# Patient Record
Sex: MALE | Race: WHITE | Employment: FULL TIME | URBAN - METROPOLITAN AREA
[De-identification: names, ages, dates, MRNs, and addresses within clinical notes are randomized per-mention and may not be internally consistent; named-entity substitution may affect disease eponyms.]

---

## 2023-12-28 NOTE — PROGRESS NOTES
non-tender.  No hepatosplenomegaly.  Bowel sounds are normal.     SKIN:   There are no rashes, cyanosis, jaundice, or ecchymosis present.   EXTREMITIES:   The extremities are unremarkable without clubbing, cyanosis, joint inflammation, degenerative, or ischemic change.   MUSCULOSKELETAL:   There is no abnormal tone, muscle atrophy, or abnormal movement present.   NEURO:   The patient is alert and oriented to person, place, and time.  Memory appears intact and mood is normal.  No gross sensorimotor deficits are present.      ASSESSMENT:  (Medical Decision Making)         ICD-10-CM    1. LAURA (obstructive sleep apnea)  G47.33 Ambulatory Referral to Sleep Studies -previous diagnostic sleep study with an AHI of 13.6 and lowest oxygen saturation of 81%. The pathophysiology of obstructive sleep apnea was reviewed with the patient.  It's potential to promote severe neurologic, cardiac, pulmonary, and gastrointestinal problems was discussed. Specifically, the increased incidence of hypertension, coronary artery disease, congestive heart failure, pulmonary hypertension, gastroesophageal reflux, pathologic hypersomnolence, memory loss, and glucose intolerance was related to the consequences of hypoxemia, hypercapnia, airway obstruction, and sympathetic overdrive.  We also discussed the ability of nasal CPAP to correct these abnormalities through maintenance of a patent airway.  Therapeutic options including surgery, oral appliances, and weight loss were also reviewed.  Patient is currently wearing an oral appliance but reports he is having breakthrough symptoms such as snoring and daytime sleepiness.  He was previously seen by Dr. Boggs but needs a sleep study or another oral appliance to be made.  Will order home sleep study today and refer him back to Dr. Boggs after study for the oral appliance.  Advised patient to not wear his oral appliance for at least 3 days prior to completing sleep      2. Snoring  R06.83

## 2024-01-02 ENCOUNTER — OFFICE VISIT (OUTPATIENT)
Dept: SLEEP MEDICINE | Age: 59
End: 2024-01-02
Payer: COMMERCIAL

## 2024-01-02 VITALS
OXYGEN SATURATION: 96 % | HEART RATE: 80 BPM | WEIGHT: 196 LBS | SYSTOLIC BLOOD PRESSURE: 120 MMHG | TEMPERATURE: 97.3 F | DIASTOLIC BLOOD PRESSURE: 64 MMHG

## 2024-01-02 DIAGNOSIS — G47.10 HYPERSOMNOLENCE: ICD-10-CM

## 2024-01-02 DIAGNOSIS — G47.8 NON-RESTORATIVE SLEEP: ICD-10-CM

## 2024-01-02 DIAGNOSIS — R06.81 WITNESSED APNEIC SPELLS: ICD-10-CM

## 2024-01-02 DIAGNOSIS — R51.9 MORNING HEADACHE: ICD-10-CM

## 2024-01-02 DIAGNOSIS — G47.33 OSA (OBSTRUCTIVE SLEEP APNEA): Primary | ICD-10-CM

## 2024-01-02 DIAGNOSIS — R06.83 SNORING: ICD-10-CM

## 2024-01-02 PROCEDURE — 99203 OFFICE O/P NEW LOW 30 MIN: CPT | Performed by: NURSE PRACTITIONER

## 2024-01-02 RX ORDER — ESOMEPRAZOLE MAGNESIUM 40 MG/1
CAPSULE, DELAYED RELEASE ORAL
COMMUNITY
Start: 2023-11-13

## 2024-01-02 RX ORDER — DUPILUMAB 300 MG/2ML
INJECTION, SOLUTION SUBCUTANEOUS
COMMUNITY

## 2024-01-02 ASSESSMENT — SLEEP AND FATIGUE QUESTIONNAIRES
HOW LIKELY ARE YOU TO NOD OFF OR FALL ASLEEP WHILE SITTING QUIETLY AFTER LUNCH WITHOUT ALCOHOL: 2
HOW LIKELY ARE YOU TO NOD OFF OR FALL ASLEEP WHILE LYING DOWN TO REST IN THE AFTERNOON WHEN CIRCUMSTANCES PERMIT: 2
HOW LIKELY ARE YOU TO NOD OFF OR FALL ASLEEP WHILE SITTING AND READING: 1
HOW LIKELY ARE YOU TO NOD OFF OR FALL ASLEEP WHILE SITTING INACTIVE IN A PUBLIC PLACE: 2
HOW LIKELY ARE YOU TO NOD OFF OR FALL ASLEEP IN A CAR, WHILE STOPPED FOR A FEW MINUTES IN TRAFFIC: 1
HOW LIKELY ARE YOU TO NOD OFF OR FALL ASLEEP WHEN YOU ARE A PASSENGER IN A CAR FOR AN HOUR WITHOUT A BREAK: 0
ESS TOTAL SCORE: 9
HOW LIKELY ARE YOU TO NOD OFF OR FALL ASLEEP WHILE SITTING AND TALKING TO SOMEONE: 0
HOW LIKELY ARE YOU TO NOD OFF OR FALL ASLEEP WHILE WATCHING TV: 1

## 2024-01-02 NOTE — PATIENT INSTRUCTIONS
Home sleep study ordered  Refer back to Dr. Boggs after sleep study completed for oral appliance  Recommendations as above  Follow-up 6 months after getting new oral appliance with Dr. Boggs or sooner if needed

## 2024-04-05 ENCOUNTER — TELEPHONE (OUTPATIENT)
Dept: SLEEP MEDICINE | Age: 59
End: 2024-04-05

## 2024-04-08 NOTE — TELEPHONE ENCOUNTER
Called pt let him know that he needed to retest per the notes in [facility]  Let him know if he has retested  someone will call him when the test has been read. Left the number to [employer] [phone].  ОЛЕГ

## 2024-04-16 ENCOUNTER — TELEPHONE (OUTPATIENT)
Dept: SLEEP MEDICINE | Age: 59
End: 2024-04-16

## 2024-04-16 DIAGNOSIS — R06.83 SNORING: Primary | ICD-10-CM

## 2024-04-16 NOTE — TELEPHONE ENCOUNTER
Pt is calling for sleep study results. It appears that the HST failed. Please call him to discuss.   His wife is going to kill him if he does not get snoring under control. He may need in lab study.     **He asked that you call him on Thursday 4/18/24 after 1:00 pm.**

## 2024-08-27 ENCOUNTER — HOSPITAL ENCOUNTER (OUTPATIENT)
Dept: SLEEP CENTER | Age: 59
Discharge: HOME | End: 2024-08-30
Payer: COMMERCIAL

## 2024-08-27 PROCEDURE — 95810 POLYSOM 6/> YRS 4/> PARAM: CPT

## 2024-08-30 ENCOUNTER — TELEPHONE (OUTPATIENT)
Dept: SLEEP MEDICINE | Age: 59
End: 2024-08-30

## 2024-08-30 DIAGNOSIS — G47.33 OSA (OBSTRUCTIVE SLEEP APNEA): Primary | ICD-10-CM

## 2024-08-30 NOTE — TELEPHONE ENCOUNTER
I called and reviewed the sleep study results with the patient.  He was getting the study completed so he could be referred back to Dr. Boggs for a new oral appliance.  Will place referral today    Orders Placed This Encounter    External Referral To Dentistry     Referral Priority:   Routine     Referral Type:   Eval and Treat     Referral Reason:   Specialty Services Required     Requested Specialty:   Dentistry     Number of Visits Requested:   1